# Patient Record
Sex: MALE | Race: WHITE | NOT HISPANIC OR LATINO | Employment: OTHER | URBAN - METROPOLITAN AREA
[De-identification: names, ages, dates, MRNs, and addresses within clinical notes are randomized per-mention and may not be internally consistent; named-entity substitution may affect disease eponyms.]

---

## 2023-05-01 DIAGNOSIS — J44.1 COPD EXACERBATION (MULTI): ICD-10-CM

## 2023-05-01 PROBLEM — R06.00 DYSPNEA: Status: ACTIVE | Noted: 2023-05-01

## 2023-05-01 PROBLEM — R06.1 STRIDOR: Status: ACTIVE | Noted: 2023-05-01

## 2023-05-01 PROBLEM — C32.9 LARYNGEAL CANCER (MULTI): Status: ACTIVE | Noted: 2023-05-01

## 2023-05-01 PROBLEM — N48.6 PEYRONIE DISEASE: Status: ACTIVE | Noted: 2023-05-01

## 2023-05-01 PROBLEM — N52.9 MALE ERECTILE DISORDER: Status: ACTIVE | Noted: 2023-05-01

## 2023-05-01 PROBLEM — H91.90 HEARING LOSS: Status: ACTIVE | Noted: 2023-05-01

## 2023-05-01 PROBLEM — E78.5 DYSLIPIDEMIA: Status: ACTIVE | Noted: 2023-05-01

## 2023-05-01 PROBLEM — F17.210 CIGARETTE SMOKER: Status: ACTIVE | Noted: 2023-05-01

## 2023-05-01 PROBLEM — Z85.21 HISTORY OF CANCER OF LARYNX: Status: ACTIVE | Noted: 2023-05-01

## 2023-05-01 PROBLEM — R91.8 MULTIPLE PULMONARY NODULES: Status: ACTIVE | Noted: 2023-05-01

## 2023-05-01 PROBLEM — R93.89 ABNORMAL CHEST CT: Status: ACTIVE | Noted: 2023-05-01

## 2023-05-01 PROBLEM — R06.02 SHORTNESS OF BREATH: Status: ACTIVE | Noted: 2023-05-01

## 2023-05-01 PROBLEM — M79.604 PAIN OF RIGHT LOWER EXTREMITY DUE TO ISCHEMIA: Status: ACTIVE | Noted: 2023-05-01

## 2023-05-01 PROBLEM — Z90.02 HISTORY OF LARYNGECTOMY: Status: ACTIVE | Noted: 2023-05-01

## 2023-05-01 PROBLEM — R91.1 LUNG NODULE: Status: ACTIVE | Noted: 2023-05-01

## 2023-05-01 PROBLEM — R49.0 HOARSENESS OF VOICE: Status: ACTIVE | Noted: 2023-05-01

## 2023-05-01 PROBLEM — I27.20 PULMONARY HTN (MULTI): Status: ACTIVE | Noted: 2023-05-01

## 2023-05-01 PROBLEM — R06.83 LOUD SNORING: Status: ACTIVE | Noted: 2023-05-01

## 2023-05-01 PROBLEM — K59.00 CONSTIPATION: Status: ACTIVE | Noted: 2023-05-01

## 2023-05-01 PROBLEM — J31.0 CHRONIC RHINITIS: Status: ACTIVE | Noted: 2023-05-01

## 2023-05-01 PROBLEM — J20.9 ACUTE BRONCHITIS WITH BRONCHOSPASM: Status: ACTIVE | Noted: 2023-05-01

## 2023-05-01 PROBLEM — J38.6: Status: ACTIVE | Noted: 2023-05-01

## 2023-05-01 PROBLEM — R68.89 FLU-LIKE SYMPTOMS: Status: ACTIVE | Noted: 2023-05-01

## 2023-05-01 PROBLEM — J44.9 STAGE 4 VERY SEVERE COPD BY GOLD CLASSIFICATION (MULTI): Status: ACTIVE | Noted: 2023-05-01

## 2023-05-01 PROBLEM — L30.4 INTERTRIGO: Status: ACTIVE | Noted: 2023-05-01

## 2023-05-01 PROBLEM — I50.9 CHF (CONGESTIVE HEART FAILURE) (MULTI): Status: ACTIVE | Noted: 2023-05-01

## 2023-05-01 PROBLEM — I99.8 PAIN OF RIGHT LOWER EXTREMITY DUE TO ISCHEMIA: Status: ACTIVE | Noted: 2023-05-01

## 2023-05-01 PROBLEM — Z86.74 HISTORY OF SUDDEN CARDIAC DEATH SUCCESSFULLY RESUSCITATED: Status: ACTIVE | Noted: 2023-05-01

## 2023-05-01 PROBLEM — Z95.5 HISTORY OF CORONARY ARTERY STENT PLACEMENT: Status: ACTIVE | Noted: 2023-05-01

## 2023-05-01 PROBLEM — R05.3 CHRONIC COUGH: Status: ACTIVE | Noted: 2023-05-01

## 2023-05-01 PROBLEM — R59.0 MEDIASTINAL ADENOPATHY: Status: ACTIVE | Noted: 2023-05-01

## 2023-05-01 PROBLEM — I50.22: Status: ACTIVE | Noted: 2023-04-07

## 2023-05-01 PROBLEM — E55.9 VITAMIN D DEFICIENCY: Status: ACTIVE | Noted: 2023-05-01

## 2023-05-01 PROBLEM — J38.01 VOCAL CORD PARALYSIS, UNILATERAL COMPLETE: Status: ACTIVE | Noted: 2023-05-01

## 2023-05-01 PROBLEM — H90.3 SNHL (SENSORY-NEURAL HEARING LOSS), ASYMMETRICAL: Status: ACTIVE | Noted: 2023-05-01

## 2023-05-01 PROBLEM — J38.1 VOCAL CORD POLYP: Status: ACTIVE | Noted: 2023-05-01

## 2023-05-01 PROBLEM — M65.4 TENOSYNOVITIS, DE QUERVAIN: Status: ACTIVE | Noted: 2023-05-01

## 2023-05-01 PROBLEM — R40.0 DAYTIME SOMNOLENCE: Status: ACTIVE | Noted: 2023-05-01

## 2023-05-01 PROBLEM — R05.9 COUGH: Status: ACTIVE | Noted: 2023-05-01

## 2023-05-01 PROBLEM — R10.9 ABDOMINAL CRAMPING: Status: ACTIVE | Noted: 2023-05-01

## 2023-05-01 PROBLEM — R53.83 FATIGUE: Status: ACTIVE | Noted: 2023-05-01

## 2023-05-01 RX ORDER — ALBUTEROL SULFATE 0.83 MG/ML
2.5 SOLUTION RESPIRATORY (INHALATION) 4 TIMES DAILY
COMMUNITY
Start: 2015-06-11 | End: 2023-05-01 | Stop reason: SDUPTHER

## 2023-05-01 RX ORDER — BUDESONIDE AND FORMOTEROL FUMARATE DIHYDRATE 160; 4.5 UG/1; UG/1
2 AEROSOL RESPIRATORY (INHALATION)
Qty: 1 EACH | Refills: 3 | Status: SHIPPED | OUTPATIENT
Start: 2023-05-01 | End: 2023-09-07 | Stop reason: SDUPTHER

## 2023-05-01 RX ORDER — BUDESONIDE AND FORMOTEROL FUMARATE DIHYDRATE 160; 4.5 UG/1; UG/1
2 AEROSOL RESPIRATORY (INHALATION)
COMMUNITY
Start: 2019-02-12 | End: 2023-05-01 | Stop reason: SDUPTHER

## 2023-05-01 RX ORDER — ALBUTEROL SULFATE 0.83 MG/ML
2.5 SOLUTION RESPIRATORY (INHALATION) 4 TIMES DAILY
Qty: 75 ML | Refills: 3 | Status: SHIPPED | OUTPATIENT
Start: 2023-05-01 | End: 2023-08-14 | Stop reason: SDUPTHER

## 2023-05-12 ENCOUNTER — HOSPITAL ENCOUNTER (OUTPATIENT)
Dept: DATA CONVERSION | Facility: HOSPITAL | Age: 68
End: 2023-05-12
Attending: OTOLARYNGOLOGY | Admitting: OTOLARYNGOLOGY
Payer: COMMERCIAL

## 2023-05-12 DIAGNOSIS — Z79.82 LONG TERM (CURRENT) USE OF ASPIRIN: ICD-10-CM

## 2023-05-12 DIAGNOSIS — I25.10 ATHEROSCLEROTIC HEART DISEASE OF NATIVE CORONARY ARTERY WITHOUT ANGINA PECTORIS: ICD-10-CM

## 2023-05-12 DIAGNOSIS — Z99.81 DEPENDENCE ON SUPPLEMENTAL OXYGEN: ICD-10-CM

## 2023-05-12 DIAGNOSIS — Z95.5 PRESENCE OF CORONARY ANGIOPLASTY IMPLANT AND GRAFT: ICD-10-CM

## 2023-05-12 DIAGNOSIS — I42.9 CARDIOMYOPATHY, UNSPECIFIED (MULTI): ICD-10-CM

## 2023-05-12 DIAGNOSIS — J38.3 OTHER DISEASES OF VOCAL CORDS: ICD-10-CM

## 2023-05-12 DIAGNOSIS — E78.5 HYPERLIPIDEMIA, UNSPECIFIED: ICD-10-CM

## 2023-05-12 DIAGNOSIS — Z87.891 PERSONAL HISTORY OF NICOTINE DEPENDENCE: ICD-10-CM

## 2023-05-12 DIAGNOSIS — J44.9 CHRONIC OBSTRUCTIVE PULMONARY DISEASE, UNSPECIFIED (MULTI): ICD-10-CM

## 2023-05-12 DIAGNOSIS — I25.2 OLD MYOCARDIAL INFARCTION: ICD-10-CM

## 2023-05-12 DIAGNOSIS — J38.6 STENOSIS OF LARYNX: ICD-10-CM

## 2023-06-13 ENCOUNTER — APPOINTMENT (OUTPATIENT)
Dept: PRIMARY CARE | Facility: CLINIC | Age: 68
End: 2023-06-13
Payer: COMMERCIAL

## 2023-06-15 LAB
COMPLETE PATHOLOGY REPORT: NORMAL
CONVERTED CLINICAL DIAGNOSIS-HISTORY: NORMAL
CONVERTED FINAL DIAGNOSIS: NORMAL
CONVERTED FINAL REPORT PDF LINK TO COPY AND PASTE: NORMAL
CONVERTED GROSS DESCRIPTION: NORMAL
CONVERTED INTRAOPERATIVE DIAGNOSIS: NORMAL

## 2023-07-12 ENCOUNTER — TELEPHONE (OUTPATIENT)
Dept: PRIMARY CARE | Facility: CLINIC | Age: 68
End: 2023-07-12
Payer: COMMERCIAL

## 2023-07-12 NOTE — TELEPHONE ENCOUNTER
Transition of Care    Inpatient facility: [facility]  Discharge diagnosis: SOB, Infection following a procedure  Discharged to: Home  Discharge date: 07/12/2023  Initial Call date: 07/12/2023  Spoke with patient/caregiver: wife                                                                     Do you need assistance  visits prior to your PCP visit: No  Home health care ordered: No  Have you been contacted by home care and have a start of care date: No  Are you taking medications as prescribed at discharge: Yes    Referral to [facility] Pharmacist: No  Patient advised to bring all medications to PCP follow-up appointment.  Patient advised to follow discharge instructions until provider follow-up.  TCM visit date: 07/19/2023  TCM provider visit with: Dr Sadi abraham

## 2023-08-14 DIAGNOSIS — I10 HYPERTENSION, UNSPECIFIED TYPE: ICD-10-CM

## 2023-08-14 DIAGNOSIS — J44.1 COPD EXACERBATION (MULTI): ICD-10-CM

## 2023-08-14 RX ORDER — METOPROLOL TARTRATE 25 MG/1
TABLET, FILM COATED ORAL
COMMUNITY
Start: 2023-07-11 | End: 2023-09-28 | Stop reason: ALTCHOICE

## 2023-08-14 RX ORDER — ALBUTEROL SULFATE 0.83 MG/ML
2.5 SOLUTION RESPIRATORY (INHALATION) 4 TIMES DAILY
Qty: 75 ML | Refills: 3 | Status: SHIPPED | OUTPATIENT
Start: 2023-08-14 | End: 2023-08-17 | Stop reason: SDUPTHER

## 2023-08-14 RX ORDER — IPRATROPIUM BROMIDE AND ALBUTEROL SULFATE 2.5; .5 MG/3ML; MG/3ML
SOLUTION RESPIRATORY (INHALATION)
COMMUNITY
Start: 2023-04-13 | End: 2023-09-28 | Stop reason: ALTCHOICE

## 2023-08-14 RX ORDER — LIDOCAINE 30 MG/G
CREAM TOPICAL
COMMUNITY
Start: 2023-05-26

## 2023-08-14 RX ORDER — BUDESONIDE 0.5 MG/2ML
INHALANT ORAL
COMMUNITY
Start: 2023-04-14 | End: 2023-09-22 | Stop reason: SDUPTHER

## 2023-08-14 RX ORDER — FUROSEMIDE 20 MG/1
TABLET ORAL
COMMUNITY
Start: 2023-04-11 | End: 2023-12-06

## 2023-08-14 RX ORDER — ATORVASTATIN CALCIUM 40 MG/1
40 TABLET, FILM COATED ORAL NIGHTLY
COMMUNITY
Start: 2022-12-12 | End: 2023-10-20 | Stop reason: ALTCHOICE

## 2023-08-14 RX ORDER — LISINOPRIL 2.5 MG/1
TABLET ORAL
Qty: 90 TABLET | Refills: 3 | Status: SHIPPED | OUTPATIENT
Start: 2023-08-14 | End: 2023-08-17 | Stop reason: SDUPTHER

## 2023-08-14 RX ORDER — LISINOPRIL 2.5 MG/1
TABLET ORAL
COMMUNITY
Start: 2023-04-11 | End: 2023-08-14 | Stop reason: SDUPTHER

## 2023-08-14 RX ORDER — OXYMETAZOLINE HYDROCHLORIDE 0.05 G/100ML
SPRAY NASAL
COMMUNITY
Start: 2022-03-30 | End: 2024-01-16 | Stop reason: ALTCHOICE

## 2023-08-14 RX ORDER — FLUTICASONE FUROATE, UMECLIDINIUM BROMIDE AND VILANTEROL TRIFENATATE 200; 62.5; 25 UG/1; UG/1; UG/1
1 POWDER RESPIRATORY (INHALATION) 2 TIMES DAILY
COMMUNITY
Start: 2022-11-09 | End: 2023-09-28 | Stop reason: SDUPTHER

## 2023-08-14 RX ORDER — BUDESONIDE AND FORMOTEROL FUMARATE DIHYDRATE 160; 4.5 UG/1; UG/1
2 AEROSOL RESPIRATORY (INHALATION) 2 TIMES DAILY
COMMUNITY
Start: 2023-07-11 | End: 2023-09-28 | Stop reason: SDUPTHER

## 2023-08-14 RX ORDER — IBUPROFEN 200 MG
1 TABLET ORAL DAILY
COMMUNITY
Start: 2023-07-28 | End: 2023-09-28 | Stop reason: ALTCHOICE

## 2023-08-17 DIAGNOSIS — J44.1 COPD EXACERBATION (MULTI): ICD-10-CM

## 2023-08-17 DIAGNOSIS — I10 HYPERTENSION, UNSPECIFIED TYPE: ICD-10-CM

## 2023-08-17 RX ORDER — LISINOPRIL 2.5 MG/1
TABLET ORAL
Qty: 90 TABLET | Refills: 3 | Status: SHIPPED | OUTPATIENT
Start: 2023-08-17

## 2023-08-17 RX ORDER — ALBUTEROL SULFATE 0.83 MG/ML
2.5 SOLUTION RESPIRATORY (INHALATION) 4 TIMES DAILY
Qty: 75 ML | Refills: 3 | Status: SHIPPED | OUTPATIENT
Start: 2023-08-17 | End: 2023-08-18 | Stop reason: SDUPTHER

## 2023-08-18 DIAGNOSIS — J44.1 COPD EXACERBATION (MULTI): ICD-10-CM

## 2023-08-18 RX ORDER — ALBUTEROL SULFATE 0.83 MG/ML
2.5 SOLUTION RESPIRATORY (INHALATION) 4 TIMES DAILY
Qty: 75 ML | Refills: 3 | Status: SHIPPED | OUTPATIENT
Start: 2023-08-18 | End: 2023-09-22 | Stop reason: SDUPTHER

## 2023-09-07 VITALS — WEIGHT: 139.11 LBS | HEIGHT: 69 IN | BODY MASS INDEX: 20.6 KG/M2

## 2023-09-07 DIAGNOSIS — J44.1 COPD EXACERBATION (MULTI): ICD-10-CM

## 2023-09-07 RX ORDER — BUDESONIDE AND FORMOTEROL FUMARATE DIHYDRATE 160; 4.5 UG/1; UG/1
2 AEROSOL RESPIRATORY (INHALATION)
Qty: 1 EACH | Refills: 3 | Status: SHIPPED | OUTPATIENT
Start: 2023-09-07 | End: 2023-09-25 | Stop reason: SDUPTHER

## 2023-09-14 NOTE — H&P
History & Physical Reviewed:   I have reviewed the History and Physical dated:  05-May-2023   History and Physical reviewed and relevant findings noted. Patient examined to review pertinent physical  findings.: No significant changes   Home Medications Reviewed: no changes noted   Allergies Reviewed: no changes noted       ERAS (Enhanced Recovery After Surgery):  ·  ERAS Patient: no     Consent:   COVID-19 Consent:  ·  COVID-19 Risk Consent Surgeon has reviewed key risks related to the risk of elza COVID-19 and if they contract COVID-19 what the risks are.     Attestation:   Note Completion:  I am a:  Resident/Fellow   Attending Attestation I saw and evaluated the patient.  I personally obtained the key and critical portions of the history and physical exam or was physically present for key and  critical portions performed by the resident/fellow. I reviewed the resident/fellow?s documentation and discussed the patient with the resident/fellow.  I agree with the resident/fellow?s medical decision making as documented in the note.     I personally evaluated the patient on 12-May-2023   Comments/ Additional Findings    Patient and I discussed the risks, benefits and alternatives to surgery and all questions answered.  Cleared to OR.          Electronic Signatures:  Blake Cordova)  (Signed 13-May-2023 15:12)   Authored: Note Completion   Co-Signer: History & Physical Reviewed, ERAS, Consent, Note Completion  Amol Johns (Resident))  (Signed 12-May-2023 13:33)   Authored: History & Physical Reviewed, ERAS, Consent,  Note Completion      Last Updated: 13-May-2023 15:12 by Blake Cordova)

## 2023-09-21 PROBLEM — R09.02 HYPOXEMIA: Status: ACTIVE | Noted: 2023-09-21

## 2023-09-21 PROBLEM — J96.10 CHRONIC RESPIRATORY FAILURE (MULTI): Status: ACTIVE | Noted: 2023-09-21

## 2023-09-21 PROBLEM — I50.20 HFREF (HEART FAILURE WITH REDUCED EJECTION FRACTION) (MULTI): Status: ACTIVE | Noted: 2023-09-21

## 2023-09-21 PROBLEM — J44.9 COPD (CHRONIC OBSTRUCTIVE PULMONARY DISEASE) (MULTI): Status: ACTIVE | Noted: 2023-09-21

## 2023-09-21 PROBLEM — I50.40 COMBINED SYSTOLIC AND DIASTOLIC HF (HEART FAILURE) (MULTI): Status: ACTIVE | Noted: 2023-09-21

## 2023-09-21 PROBLEM — R09.02 HYPOXIA: Status: ACTIVE | Noted: 2023-09-21

## 2023-09-21 PROBLEM — S10.93XA HEMATOMA OF NECK: Status: ACTIVE | Noted: 2023-09-21

## 2023-09-21 PROBLEM — R13.14 PHARYNGOESOPHAGEAL DYSPHAGIA: Status: ACTIVE | Noted: 2023-09-21

## 2023-09-21 PROBLEM — Q31.9 DYSPLASIA OF LARYNX: Status: ACTIVE | Noted: 2023-09-21

## 2023-09-21 PROBLEM — C32.1 CANCER OF SUPRAGLOTTIS (MULTI): Status: ACTIVE | Noted: 2023-09-21

## 2023-09-21 PROBLEM — S80.10XA LEG HEMATOMA: Status: ACTIVE | Noted: 2023-09-21

## 2023-09-21 PROBLEM — Z79.899 HIGH RISK MEDICATION USE: Status: ACTIVE | Noted: 2023-09-21

## 2023-09-21 PROBLEM — R06.89 ACUTE RESPIRATORY INSUFFICIENCY: Status: ACTIVE | Noted: 2023-09-21

## 2023-09-21 PROBLEM — K94.29: Status: ACTIVE | Noted: 2023-09-21

## 2023-09-21 RX ORDER — METOPROLOL SUCCINATE 50 MG/1
50 TABLET, EXTENDED RELEASE ORAL DAILY
COMMUNITY
Start: 2023-07-27 | End: 2023-10-20 | Stop reason: ALTCHOICE

## 2023-09-21 RX ORDER — SODIUM CHLORIDE 0.9 G/100ML
IRRIGANT IRRIGATION ONCE
COMMUNITY
End: 2023-09-28 | Stop reason: ALTCHOICE

## 2023-09-21 RX ORDER — OXYCODONE HYDROCHLORIDE 5 MG/1
5 TABLET ORAL
COMMUNITY
Start: 2023-07-20 | End: 2023-09-28 | Stop reason: SDUPTHER

## 2023-09-21 RX ORDER — BENZONATATE 200 MG/1
200 CAPSULE ORAL 3 TIMES DAILY PRN
COMMUNITY
End: 2023-11-27 | Stop reason: SDUPTHER

## 2023-09-21 RX ORDER — ACETAMINOPHEN 160 MG/5ML
SUSPENSION ORAL
COMMUNITY
End: 2024-01-16 | Stop reason: WASHOUT

## 2023-09-22 DIAGNOSIS — J44.1 COPD EXACERBATION (MULTI): ICD-10-CM

## 2023-09-25 DIAGNOSIS — J44.1 COPD EXACERBATION (MULTI): ICD-10-CM

## 2023-09-25 RX ORDER — BUDESONIDE 0.5 MG/2ML
0.5 INHALANT ORAL
Qty: 120 ML | Refills: 11 | Status: SHIPPED | OUTPATIENT
Start: 2023-09-25 | End: 2024-02-19 | Stop reason: ALTCHOICE

## 2023-09-25 RX ORDER — ALBUTEROL SULFATE 0.83 MG/ML
2.5 SOLUTION RESPIRATORY (INHALATION) 4 TIMES DAILY
Qty: 75 ML | Refills: 3 | Status: SHIPPED | OUTPATIENT
Start: 2023-09-25

## 2023-09-25 RX ORDER — BUDESONIDE AND FORMOTEROL FUMARATE DIHYDRATE 160; 4.5 UG/1; UG/1
2 AEROSOL RESPIRATORY (INHALATION)
Qty: 1 EACH | Refills: 11 | Status: SHIPPED | OUTPATIENT
Start: 2023-09-25 | End: 2023-09-28 | Stop reason: ALTCHOICE

## 2023-09-25 RX ORDER — ALBUTEROL SULFATE 90 UG/1
2 AEROSOL, METERED RESPIRATORY (INHALATION) EVERY 4 HOURS PRN
Qty: 18 G | Refills: 11 | Status: SHIPPED | OUTPATIENT
Start: 2023-09-25 | End: 2023-09-28 | Stop reason: SDUPTHER

## 2023-09-28 ENCOUNTER — OFFICE VISIT (OUTPATIENT)
Dept: PRIMARY CARE | Facility: CLINIC | Age: 68
End: 2023-09-28
Payer: COMMERCIAL

## 2023-09-28 VITALS
SYSTOLIC BLOOD PRESSURE: 116 MMHG | DIASTOLIC BLOOD PRESSURE: 60 MMHG | OXYGEN SATURATION: 92 % | BODY MASS INDEX: 23.49 KG/M2 | HEIGHT: 68 IN | WEIGHT: 155 LBS | HEART RATE: 73 BPM | RESPIRATION RATE: 18 BRPM

## 2023-09-28 DIAGNOSIS — Z00.00 ANNUAL PHYSICAL EXAM: Primary | ICD-10-CM

## 2023-09-28 DIAGNOSIS — E78.5 DYSLIPIDEMIA: ICD-10-CM

## 2023-09-28 DIAGNOSIS — C32.1 CANCER OF SUPRAGLOTTIS (MULTI): ICD-10-CM

## 2023-09-28 DIAGNOSIS — G89.18 POST-OP PAIN: ICD-10-CM

## 2023-09-28 DIAGNOSIS — Q21.12 PATENT FORAMEN OVALE (HHS-HCC): ICD-10-CM

## 2023-09-28 DIAGNOSIS — C77.0 SECONDARY AND UNSPECIFIED MALIGNANT NEOPLASM OF LYMPH NODES OF HEAD, FACE AND NECK (MULTI): ICD-10-CM

## 2023-09-28 DIAGNOSIS — J44.9 CHRONIC OBSTRUCTIVE PULMONARY DISEASE, UNSPECIFIED COPD TYPE (MULTI): ICD-10-CM

## 2023-09-28 DIAGNOSIS — J96.11 CHRONIC RESPIRATORY FAILURE WITH HYPOXIA (MULTI): ICD-10-CM

## 2023-09-28 DIAGNOSIS — I50.20 HFREF (HEART FAILURE WITH REDUCED EJECTION FRACTION) (MULTI): ICD-10-CM

## 2023-09-28 DIAGNOSIS — F17.210 CIGARETTE SMOKER: ICD-10-CM

## 2023-09-28 DIAGNOSIS — J44.1 COPD EXACERBATION (MULTI): ICD-10-CM

## 2023-09-28 DIAGNOSIS — I50.22 CHRONIC SYSTOLIC (CONGESTIVE) HEART FAILURE (MULTI): ICD-10-CM

## 2023-09-28 PROCEDURE — 1036F TOBACCO NON-USER: CPT | Performed by: INTERNAL MEDICINE

## 2023-09-28 PROCEDURE — 1126F AMNT PAIN NOTED NONE PRSNT: CPT | Performed by: INTERNAL MEDICINE

## 2023-09-28 PROCEDURE — 1159F MED LIST DOCD IN RCRD: CPT | Performed by: INTERNAL MEDICINE

## 2023-09-28 PROCEDURE — 3008F BODY MASS INDEX DOCD: CPT | Performed by: INTERNAL MEDICINE

## 2023-09-28 PROCEDURE — 99214 OFFICE O/P EST MOD 30 MIN: CPT | Performed by: INTERNAL MEDICINE

## 2023-09-28 RX ORDER — FLUTICASONE FUROATE, UMECLIDINIUM BROMIDE AND VILANTEROL TRIFENATATE 200; 62.5; 25 UG/1; UG/1; UG/1
1 POWDER RESPIRATORY (INHALATION) 2 TIMES DAILY
Qty: 60 EACH | Refills: 11 | Status: SHIPPED | OUTPATIENT
Start: 2023-09-28 | End: 2023-11-13

## 2023-09-28 RX ORDER — OXYCODONE HYDROCHLORIDE 5 MG/1
5 TABLET ORAL EVERY 6 HOURS PRN
Qty: 30 TABLET | Refills: 0 | Status: SHIPPED | OUTPATIENT
Start: 2023-09-28 | End: 2024-01-16 | Stop reason: ALTCHOICE

## 2023-09-28 RX ORDER — BUDESONIDE AND FORMOTEROL FUMARATE DIHYDRATE 160; 4.5 UG/1; UG/1
2 AEROSOL RESPIRATORY (INHALATION)
Qty: 1 EACH | Refills: 11 | Status: SHIPPED | OUTPATIENT
Start: 2023-09-28 | End: 2024-01-16 | Stop reason: ALTCHOICE

## 2023-09-28 RX ORDER — ALBUTEROL SULFATE 90 UG/1
2 AEROSOL, METERED RESPIRATORY (INHALATION) EVERY 4 HOURS PRN
Qty: 18 G | Refills: 11 | Status: SHIPPED | OUTPATIENT
Start: 2023-09-28 | End: 2024-09-27

## 2023-09-28 ASSESSMENT — PATIENT HEALTH QUESTIONNAIRE - PHQ9
2. FEELING DOWN, DEPRESSED OR HOPELESS: NOT AT ALL
SUM OF ALL RESPONSES TO PHQ9 QUESTIONS 1 & 2: 0
1. LITTLE INTEREST OR PLEASURE IN DOING THINGS: NOT AT ALL

## 2023-09-28 ASSESSMENT — PAIN SCALES - GENERAL: PAINLEVEL: 0-NO PAIN

## 2023-10-02 DIAGNOSIS — G89.18 POST-OP PAIN: ICD-10-CM

## 2023-10-02 DIAGNOSIS — R52 PAIN: ICD-10-CM
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
## 2023-10-15 PROBLEM — C77.0 SECONDARY AND UNSPECIFIED MALIGNANT NEOPLASM OF LYMPH NODES OF HEAD, FACE AND NECK (MULTI): Status: ACTIVE | Noted: 2023-10-15

## 2023-10-15 PROBLEM — Q21.12 PATENT FORAMEN OVALE (HHS-HCC): Status: ACTIVE | Noted: 2023-10-15

## 2023-10-16 NOTE — PROGRESS NOTES
"Subjective   Jordy Brian is a 68 y.o. male who presents for Follow-up.  Patient presents for follow-up.  He requests refills of his inhalers.  Breathing at baseline.        Objective     /60 (BP Location: Right arm, Patient Position: Sitting, BP Cuff Size: Adult)   Pulse 73   Resp 18   Ht 1.727 m (5' 8\")   Wt 70.3 kg (155 lb)   SpO2 92%   BMI 23.57 kg/m²      Physical Exam  Constitutional:       Appearance: He is ill-appearing.   HENT:      Head: Normocephalic and atraumatic.      Nose: Nose normal.      Mouth/Throat:      Mouth: Mucous membranes are moist.      Pharynx: Oropharynx is clear.   Eyes:      Extraocular Movements: Extraocular movements intact.      Pupils: Pupils are equal, round, and reactive to light.   Cardiovascular:      Rate and Rhythm: Normal rate and regular rhythm.   Pulmonary:      Effort: No respiratory distress.      Breath sounds: Normal breath sounds. No wheezing, rhonchi or rales.   Abdominal:      General: Bowel sounds are normal. There is no distension.      Palpations: Abdomen is soft.      Tenderness: There is no abdominal tenderness. There is no guarding.   Musculoskeletal:      Right lower leg: No edema.      Left lower leg: No edema.   Skin:     General: Skin is warm and dry.   Neurological:      Mental Status: He is alert and oriented to person, place, and time. Mental status is at baseline.   Psychiatric:         Mood and Affect: Mood normal.         Behavior: Behavior normal.         Assessment/Plan   Problem List Items Addressed This Visit       Cigarette smoker    COPD exacerbation (CMS/[facility])    Relevant Medications    albuterol (Ventolin HFA) 90 mcg/actuation inhaler    Dyslipidemia    COPD (chronic obstructive pulmonary disease) (CMS/[facility])    Relevant Medications    budesonide-formoteroL (Symbicort) 160-4.5 mcg/actuation inhaler    Trelegy Ellipta 200-62.5-25 mcg blister with device    HFrEF (heart failure with reduced ejection fraction) (CMS/[facility])    Cancer of " supraglottis (CMS/HCC)    Chronic respiratory failure (CMS/HCC)    Secondary and unspecified malignant neoplasm of lymph nodes of head, face and neck (CMS/HCC)    Patent foramen ovale     Other Visit Diagnoses       Annual physical exam    -  Primary    Relevant Orders    CBC    Comprehensive Metabolic Panel    Hemoglobin A1C    Lipid Panel    Prostate Specific Antigen    Thyroid Stimulating Hormone    Vitamin D 1,25 Dihydroxy (for eval of hypercalcemia)    Post-op pain        Relevant Medications    oxyCODONE (Roxicodone) 5 mg immediate release tablet    Chronic systolic (congestive) heart failure (CMS/HCC)              Continue medications as previously prescribed, refills provided  Return in 4 months for physical       Sadi Bronson DO

## 2023-10-19 ENCOUNTER — TELEPHONE (OUTPATIENT)
Dept: OTOLARYNGOLOGY | Facility: HOSPITAL | Age: 68
End: 2023-10-19
Payer: COMMERCIAL

## 2023-10-19 NOTE — TELEPHONE ENCOUNTER
Chart reviewed.  It appears that the original order to [facility] did not include the shower guard.    I completed the order and emailed it Sheridan Tirado RN.  She will be working with Dr. Lind tomorrow.  He's in surgery at [facility] all day today.  Sheridan will get him to sign it and fax it to [facility] tomorrow.    I called and let Nenita know.  She was very appreciative

## 2023-10-19 NOTE — TELEPHONE ENCOUNTER
Patient of Dr. Lind's calling to get a script sent to [facility] for a shower shield for his stoma.  Please advise.

## 2023-10-20 ENCOUNTER — OFFICE VISIT (OUTPATIENT)
Dept: OTOLARYNGOLOGY | Facility: CLINIC | Age: 68
End: 2023-10-20
Payer: COMMERCIAL

## 2023-10-20 ENCOUNTER — CLINICAL SUPPORT (OUTPATIENT)
Dept: AUDIOLOGY | Facility: CLINIC | Age: 68
End: 2023-10-20
Payer: COMMERCIAL

## 2023-10-20 VITALS — WEIGHT: 160.6 LBS | BODY MASS INDEX: 22.99 KG/M2 | TEMPERATURE: 96.5 F | HEIGHT: 70 IN

## 2023-10-20 DIAGNOSIS — R04.2 HEMOPTYSIS: ICD-10-CM

## 2023-10-20 DIAGNOSIS — H91.8X3 ASYMMETRICAL HEARING LOSS: Primary | ICD-10-CM

## 2023-10-20 DIAGNOSIS — C32.9 LARYNGEAL CANCER (MULTI): Primary | ICD-10-CM

## 2023-10-20 DIAGNOSIS — Q31.9 DYSPLASIA OF LARYNX: ICD-10-CM

## 2023-10-20 PROCEDURE — 1126F AMNT PAIN NOTED NONE PRSNT: CPT | Performed by: OTOLARYNGOLOGY

## 2023-10-20 PROCEDURE — 92557 COMPREHENSIVE HEARING TEST: CPT

## 2023-10-20 PROCEDURE — 31615 TRCHEOBRNCHSC EST TRACHS INC: CPT | Performed by: OTOLARYNGOLOGY

## 2023-10-20 PROCEDURE — 99214 OFFICE O/P EST MOD 30 MIN: CPT | Performed by: OTOLARYNGOLOGY

## 2023-10-20 PROCEDURE — 1160F RVW MEDS BY RX/DR IN RCRD: CPT | Performed by: OTOLARYNGOLOGY

## 2023-10-20 PROCEDURE — 31575 DIAGNOSTIC LARYNGOSCOPY: CPT | Performed by: OTOLARYNGOLOGY

## 2023-10-20 PROCEDURE — 1036F TOBACCO NON-USER: CPT | Performed by: OTOLARYNGOLOGY

## 2023-10-20 PROCEDURE — 3008F BODY MASS INDEX DOCD: CPT | Performed by: OTOLARYNGOLOGY

## 2023-10-20 PROCEDURE — 92550 TYMPANOMETRY & REFLEX THRESH: CPT

## 2023-10-20 PROCEDURE — 1159F MED LIST DOCD IN RCRD: CPT | Performed by: OTOLARYNGOLOGY

## 2023-10-20 ASSESSMENT — PATIENT HEALTH QUESTIONNAIRE - PHQ9
1. LITTLE INTEREST OR PLEASURE IN DOING THINGS: NOT AT ALL
2. FEELING DOWN, DEPRESSED OR HOPELESS: NOT AT ALL
SUM OF ALL RESPONSES TO PHQ9 QUESTIONS 1 AND 2: 0

## 2023-10-20 NOTE — PROGRESS NOTES
Here for follow up  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 remote procedure for galvanized larynx per report from welding issues      Doing well    Having some hemoptysis    Not using HME      Doing well with electrolarynx but cuts out periodically      Physical Exam:  CONSTITUTIONAL:  No acute distress  VOICE:  No hoarseness or other abnormality  RESPIRATION:  Breathing comfortably, no stridor  CV:  No clubbing/cyanosis/edema in hands  EYES:  EOM intact, sclera normal  NEURO:  Alert and oriented times 3, Cranial nerves II-XII grossly intact and symmetric bilaterally  HEAD AND FACE:  Symmetric facial features, no masses or lesions, sinuses non-tender to palpation  SALIVARY GLANDS:  Parotid and submandibular glands normal bilaterally  EARS:  Normal external ears, external auditory canals, and TMs to otoscopy, normal hearing to whispered voice.  NOSE:  External nose midline, anterior rhinoscopy is normal with limited visualization to the anterior aspect of the interior turbinates, no bleeding or drainage, no lesions  ORAL CAVITY/OROPHARYNX/LIPS:  Normal mucous membranes, normal floor of mouth/tongue/OP, no masses or lesions  PHARYNGEAL WALLS:  No masses or lesions  NECK/LYMPH:  stoma ok  SKIN:  incisions ok  PSYCH:  Alert and oriented with appropriate mood and affect         Procedure Note: Flexible Tracheoscopy  Verbal informed consent was obtained from the patient/patient's guardian. A flexible fiber optic nasolaryngoscope was placed into the patient's Tracheostomy tube. The tracheostomy tube, proximal trachea, and margareth were visualized and were normal except as listed below. Significant findings included  Some dessication noted      After informed verbal consent and to better evaluate the reconstructed pharyngo-esophagus , the nasal cavity was instilled with topical decongestant and anesthesia after which flexible endoscopy was performed showing patency of the proximal and distal  anastomosis. The flap was in good position, the flap was visualized and no mucosal abnormalities were noted.  The patient tolerated procedure well and the scope was removed,  no obvious strictures noted           Imp:  stable progres        Refer to dr chicas for consideration of TEP    Use HME< home humidifier      F/U with me 4 months

## 2023-10-20 NOTE — PROGRESS NOTES
ADULT AUDIOLOGY AUDIOMETRIC EVALUATION    Name:  Jordy Brian  :  1955  Age:  68 y.o.  Date of Evaluation:  10/20/2023    HISTORY  Jordy Brian is seen today at the request of Zan Lind MD for a reevaluation of hearing.  He arrives with his wife. He reports he obtained hearing aids from our office over 7 years ago that he does not wear. He reports they were more hassle than improvement so he does not wear them.  Patient denies ear pain, ear pressure, ear drainage, ear infections, ear surgeries, tinnitus, noise exposure, family history of hearing loss and dizziness/vertigo.      We discussed newer technology and the benefits of Bluetooth and rechargeable. [facility] are no longer [facility] providers so he will need to find a [facility] hearing providers.     AUDIOLOGIC EVALUATION    OTOSCOPY  Otoscopic inspection revealed clear canals and visualization of the eardrum bilaterally.    IMMITTANCE  Normal tympanograms were obtained bilaterally, consistent with a normal moving eardrums and the likely absence of fluid.    Ipsilateral acoustic reflexes were tested and absent at 500Hz, 1000Hz, 2000Hz, and 4000Hz bilaterally.    AUDIOMETRIC TESTING  Pure tone audiometry conducted via insert headphones from 125 Hz - 8000 Hz with good reliability was consistent with:  Right ear: mild sloping to severe sensorineural hearing loss  Left ear: normal hearing from 125 Hz - 500 Hz sloping to severe sensorineural hearing loss.     SPEECH RECOGNITION TESTING (SRT)  SRT was in agreement with pure tone averages bilaterally ( Right: 50 dB HL, Left: 25 dB HL).    WORD RECOGNITION SCORE (WRS)  WRS was (100%) in the right ear and (100%) in the left ear using ordered by difficulty NU6 word list.    IMPRESSIONS:  The results of today's assessment after consistent with normal tympanograms, absent acoustic reflexes, asymmetrical hearing loss bilaterally. The patient was counseled with regard to the  findings.    RECOMMENDATIONS:  Treatment Plan:.  Follow up with ENT/PCP as recommended.  Annual hearing assessments as recommended. Follow up sooner if patient feels hearing or symptoms have changed.  Find [facility] in their region  Start wearing current hearing aids full time   Contact the clinic with questions or concerns at [phone].     PATIENT EDUCATION:   Discussed results and recommendations with patient.  Questions were addressed and the patient was encouraged to contact our department should concerns arise.      Sabrina Dobson, CCC-A, [facility]  Licensed Audiologist

## 2023-10-23 DIAGNOSIS — R49.1 APHONIA: ICD-10-CM

## 2023-11-10 DIAGNOSIS — J44.9 CHRONIC OBSTRUCTIVE PULMONARY DISEASE, UNSPECIFIED COPD TYPE (MULTI): ICD-10-CM

## 2023-11-13 RX ORDER — FLUTICASONE FUROATE, UMECLIDINIUM BROMIDE AND VILANTEROL TRIFENATATE 200; 62.5; 25 UG/1; UG/1; UG/1
1 POWDER RESPIRATORY (INHALATION) 2 TIMES DAILY
Qty: 60 EACH | Refills: 11 | Status: SHIPPED | OUTPATIENT
Start: 2023-11-13 | End: 2023-12-07 | Stop reason: SDUPTHER

## 2023-11-15 ENCOUNTER — APPOINTMENT (OUTPATIENT)
Dept: SPEECH THERAPY | Facility: CLINIC | Age: 68
End: 2023-11-15
Payer: COMMERCIAL

## 2023-11-20 ENCOUNTER — TELEPHONE (OUTPATIENT)
Dept: OTOLARYNGOLOGY | Facility: HOSPITAL | Age: 68
End: 2023-11-20
Payer: COMMERCIAL

## 2023-11-20 NOTE — TELEPHONE ENCOUNTER
Patient would like a call back they need to order a neck mask for his oxygen can be reached at [phone]

## 2023-11-22 DIAGNOSIS — C32.9 LARYNGEAL CANCER (MULTI): Primary | ICD-10-CM

## 2023-11-22 DIAGNOSIS — Z90.02 S/P LARYNGECTOMY: ICD-10-CM

## 2023-11-22 DIAGNOSIS — R05.9 COUGH, UNSPECIFIED TYPE: ICD-10-CM

## 2023-11-22 DIAGNOSIS — Z99.81 OXYGEN DEPENDENT: ICD-10-CM

## 2023-11-22 NOTE — PROGRESS NOTES
Patients wife calling needs orders for trach collar for 02 to attach to. She reports she has one at home and it is getting worn down. Discussed Dr. Lind will need to sign the script and he is out till Monday. She understands and is ok with this plan

## 2023-11-27 RX ORDER — BENZONATATE 200 MG/1
200 CAPSULE ORAL 3 TIMES DAILY PRN
Qty: 90 CAPSULE | Refills: 2 | Status: SHIPPED | OUTPATIENT
Start: 2023-11-27

## 2023-11-27 NOTE — TELEPHONE ENCOUNTER
I spoke with patient wife.  The company he gets his O2 from does not have the trach collars for his O2.  I sent order to Shaylee De La Vega at [place].   She will forward it to Christie who has been working on this for patient.

## 2023-11-29 ENCOUNTER — TELEMEDICINE CLINICAL SUPPORT (OUTPATIENT)
Dept: SPEECH THERAPY | Facility: CLINIC | Age: 68
End: 2023-11-29
Payer: COMMERCIAL

## 2023-11-29 DIAGNOSIS — Z90.02 HISTORY OF LARYNGECTOMY: ICD-10-CM

## 2023-11-29 DIAGNOSIS — R49.1 APHONIA: ICD-10-CM

## 2023-11-29 DIAGNOSIS — C32.9 LARYNGEAL CANCER (MULTI): Primary | ICD-10-CM

## 2023-11-29 PROCEDURE — 92597 ORAL SPEECH DEVICE EVAL: CPT | Mod: CCI,GN | Performed by: SPEECH-LANGUAGE PATHOLOGIST

## 2023-11-29 PROCEDURE — 92607 EX FOR SPEECH DEVICE RX 1HR: CPT | Mod: GN | Performed by: SPEECH-LANGUAGE PATHOLOGIST

## 2023-11-29 ASSESSMENT — PAIN - FUNCTIONAL ASSESSMENT: PAIN_FUNCTIONAL_ASSESSMENT: 0-10

## 2023-11-29 ASSESSMENT — PAIN SCALES - GENERAL: PAINLEVEL_OUTOF10: 0 - NO PAIN

## 2023-11-30 PROBLEM — R49.1 APHONIA: Status: ACTIVE | Noted: 2023-11-30

## 2023-11-30 NOTE — PROGRESS NOTES
Speech-Language Pathology    Voice/TEP Evaluation    Patient Name: Jordy Brian  MRN: [id number]  Today's Date: 11/29/2023     Time Calculation  Start Time: 1530  Stop Time: 1630  Time Calculation (min): 60 min      Current Problem:  Patient Active Problem List   Diagnosis    Abdominal cramping    Constipation    Abnormal chest CT    Acute bronchitis with bronchospasm    CHF (congestive heart failure) (CMS/HCC)    Chronic left systolic heart failure (CMS/HCC)    Chronic rhinitis    Cigarette smoker    COPD exacerbation (CMS/HCC)    Stage 4 very severe COPD by GOLD classification (CMS/HCC)    Chronic cough    Cough    Loud snoring    Daytime somnolence    Dyslipidemia    Fatigue    Flu-like symptoms    Hearing loss    History of cancer of larynx    History of coronary artery stent placement    History of laryngectomy    History of sudden cardiac death successfully resuscitated    Hoarseness of voice    Intertrigo    Laryngeal cancer (CMS/HCC)    Male erectile disorder    Mediastinal adenopathy    Lung nodule    Multiple pulmonary nodules    Obstruction of larynx    Pain of right lower extremity due to ischemia    Peyronie disease    Pulmonary HTN (CMS/HCC)    Dyspnea    Shortness of breath    SNHL (sensory-neural hearing loss), asymmetrical    Stridor    Tenosynovitis, de Quervain    Vitamin D deficiency    Vocal cord paralysis, unilateral complete    Vocal cord polyp    COPD (chronic obstructive pulmonary disease) (CMS/HCC)    Combined systolic and diastolic HF (heart failure) (CMS/HCC)    HFrEF (heart failure with reduced ejection fraction) (CMS/HCC)    Cancer of supraglottis (CMS/HCC)    High risk medication use    Pain from gastrostomy tube (CMS/HCC)    Pharyngoesophageal dysphagia    Chronic respiratory failure (CMS/HCC)    Leg hematoma    Hematoma of neck    Hypoxemia    Dysplasia of larynx    Hypoxia    Acute respiratory insufficiency    Secondary and unspecified malignant neoplasm of lymph nodes of head,  face and neck (CMS/HCC)    Patent foramen ovale    Hemoptysis    Aphonia       Voice Assessment:   Patient is a 67 yo male presenting for consideration of TEP placement for increased access to alaryngeal speech.     s/p total laryngectomy, L neck dissection, and reconstruction with L  anterolateral thigh free flap on 6/21/2023. 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 Two penrose drains were placed in neck wound for twice a day betadine irrigations. Transitioned to wet to dry packing of wound on 7/10/2023.    Here today with two family friends, caregivers. Currently wearing LaryTube+O2 mask. Negative for HME. HME worn for duration of treatment following clinician request.     Completed education on alaryngeal speech options. Patient currently uses electrolarynx + gestures + written communication. Provided him with an overview of TEP placement and use to include daily maintenance, leak check and need for replacement given prosthesis failure. Patient understands need for continued management with our speech pathology team. He is interested in pursuing TE speech. Plan to assist in scheduling a clinic visit with myself and my laryngology partner, Dr. Cordova for initial placement.        TEP Plan of Care:  Therapy warranted: Yes  Frequency: PRN  Duration: Indefinitely  TEP Recommendations: Assess condition of TEP, Measure length of TEP, Determine type and order voice prosthesis, Increase alaryngeal speech at the conversational level  Prognosis: Good  Factors affecting prognosis: None  Date of onset: 06/01/23    Subjective   Current Problem: Patient is a 67 yo male presenting with aphonia following total laryngectomy, L neck dissection, and reconstruction with L  anterolateral thigh free flap on 6/21/2023. 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 Two penrose drains were placed in neck wound for twice a day  betadine irrigations. Transitioned to wet to dry packing of wound on 7/10/2023.    Currently tolerating full PO reporting increased weight weekly. Primary means of communication this date: electrolarynx. He is 70% intelligible to this novel listener. Patient indicating interest in TE voicing.     General Visit Information:  Patient Class: Outpatient  Living Environment: Home  Arrival: Accompanied by: __ (long term friends x2)  Ordering Physician: Dr. Lind  Reason for Referral: consideration for secondary TEP placement s/p total laryngectomy  Referred By: Dr. Lind  Past Medical History Relevant to Rehab: GERD, COPD, laryngeal cancer  Prior Level of Function: Decreased function  Patient Seen During This Visit: Yes  Date of Onset: 06/01/23    Objective     Pain Assessment:  Pain Assessment: 0-10  Pain Score: 0 - No pain       Voice Use Inventory:  Voice misuse/abuse: None  Exposure to Noise: No  Exposure to respiratory irritants: Yes  Consistent use of singing voice: No  Occupation relies on speaking voice: No    Patient Self Assessment:  Daily water intake: Yes  Daily caffeine intake: Yes  Alcohol intake: Yes  Smoking history: Yes  Reflux history: Yes    Voice Objective:  Motor Speech Production: WFL  Pitch:  (aphonia)  Voice Quality:  (aphonia)  Fluency: WFL  Prosody: Monotone (+electrolarynx)  Resonance: WFL    TEP Objective:   Patient demonstrated good manual dexterity for prosthesis maintenance. He is now able to drive [employer] to attend puncture placement and subsequent visits for prosthesis check/change. Encouraged patient to begin regular use of HMEs. Patient does not prefer them 2/2 increased restriction with breathing. Will continue to build tolerance - may benefit from light flow HME. Patient aware of potential increase in cough/mucus expectoration following initial period of HME use 2/2 improved pulmonary environment.     TEP Treatment:  Instruction/Information Provided: Laryngectomy supplies/resources,  Cleaning prosthesis, Candida prevention (Initial TEP education)  Education type: Verbal education provided, Written education provided  Individuals educated: Patient, Caregiver  Response to education: Verbalized understanding, Demonstrated understanding, Needs review  Patient/Caregiver verbalized understanding and agreement: Yes  Treatment Goals: Clinician will determine the appropiate size and type of  needed and successfully place a new , Patient will demonstrate good care, maintenance and use of voice prosthesis, Patient will produce intelligible TE speech with 80% accuracy, Patient will demonstrate adequate stoma management and participate in pulmonary rehabilitation    Time In: 1530  Time Out: 1635

## 2023-12-06 DIAGNOSIS — I27.20 PULMONARY HYPERTENSION, UNSPECIFIED (MULTI): ICD-10-CM

## 2023-12-06 DIAGNOSIS — E55.9 VITAMIN D DEFICIENCY, UNSPECIFIED: ICD-10-CM

## 2023-12-06 DIAGNOSIS — I50.40 UNSPECIFIED COMBINED SYSTOLIC (CONGESTIVE) AND DIASTOLIC (CONGESTIVE) HEART FAILURE (MULTI): ICD-10-CM

## 2023-12-06 RX ORDER — ERGOCALCIFEROL 1.25 MG/1
CAPSULE ORAL
Qty: 12 CAPSULE | Refills: 1 | Status: SHIPPED | OUTPATIENT
Start: 2023-12-06 | End: 2024-01-16 | Stop reason: SDUPTHER

## 2023-12-06 RX ORDER — FUROSEMIDE 20 MG/1
20 TABLET ORAL DAILY
Qty: 90 TABLET | Refills: 3 | Status: SHIPPED | OUTPATIENT
Start: 2023-12-06

## 2023-12-06 RX ORDER — METOPROLOL SUCCINATE 50 MG/1
50 TABLET, EXTENDED RELEASE ORAL DAILY
Qty: 90 TABLET | Refills: 2 | Status: SHIPPED | OUTPATIENT
Start: 2023-12-06 | End: 2024-03-29 | Stop reason: SDUPTHER

## 2023-12-07 DIAGNOSIS — J44.9 CHRONIC OBSTRUCTIVE PULMONARY DISEASE, UNSPECIFIED COPD TYPE (MULTI): ICD-10-CM

## 2023-12-07 RX ORDER — FLUTICASONE FUROATE, UMECLIDINIUM BROMIDE AND VILANTEROL TRIFENATATE 200; 62.5; 25 UG/1; UG/1; UG/1
2 POWDER RESPIRATORY (INHALATION) 2 TIMES DAILY
Qty: 180 EACH | Refills: 7 | Status: SHIPPED | OUTPATIENT
Start: 2023-12-07 | End: 2024-01-09

## 2024-01-02 ENCOUNTER — APPOINTMENT (OUTPATIENT)
Dept: PRIMARY CARE | Facility: CLINIC | Age: 69
End: 2024-01-02
Payer: COMMERCIAL

## 2024-01-08 DIAGNOSIS — J44.9 CHRONIC OBSTRUCTIVE PULMONARY DISEASE, UNSPECIFIED COPD TYPE (MULTI): ICD-10-CM

## 2024-01-09 RX ORDER — FLUTICASONE FUROATE, UMECLIDINIUM BROMIDE AND VILANTEROL TRIFENATATE 200; 62.5; 25 UG/1; UG/1; UG/1
2 POWDER RESPIRATORY (INHALATION) 2 TIMES DAILY
Qty: 60 EACH | Refills: 7 | Status: SHIPPED | OUTPATIENT
Start: 2024-01-09 | End: 2024-01-16 | Stop reason: SDUPTHER

## 2024-01-10 ENCOUNTER — LAB (OUTPATIENT)
Dept: LAB | Facility: LAB | Age: 69
End: 2024-01-10
Payer: COMMERCIAL

## 2024-01-10 DIAGNOSIS — Z00.00 ANNUAL PHYSICAL EXAM: ICD-10-CM

## 2024-01-10 LAB
ALBUMIN SERPL BCP-MCNC: 4.5 G/DL (ref 3.4–5)
ALP SERPL-CCNC: 30 U/L (ref 33–136)
ALT SERPL W P-5'-P-CCNC: 14 U/L (ref 10–52)
ANION GAP SERPL CALC-SCNC: 13 MMOL/L (ref 10–20)
AST SERPL W P-5'-P-CCNC: 21 U/L (ref 9–39)
BILIRUB SERPL-MCNC: 0.5 MG/DL (ref 0–1.2)
BUN SERPL-MCNC: 23 MG/DL (ref 6–23)
CALCIUM SERPL-MCNC: 9.7 MG/DL (ref 8.6–10.6)
CHLORIDE SERPL-SCNC: 103 MMOL/L (ref 98–107)
CHOLEST SERPL-MCNC: 185 MG/DL (ref 0–199)
CHOLESTEROL/HDL RATIO: 2.5
CO2 SERPL-SCNC: 29 MMOL/L (ref 21–32)
CREAT SERPL-MCNC: 1.05 MG/DL (ref 0.5–1.3)
EGFRCR SERPLBLD CKD-EPI 2021: 77 ML/MIN/1.73M*2
ERYTHROCYTE [DISTWIDTH] IN BLOOD BY AUTOMATED COUNT: 12.6 % (ref 11.5–14.5)
EST. AVERAGE GLUCOSE BLD GHB EST-MCNC: 103 MG/DL
GLUCOSE SERPL-MCNC: 93 MG/DL (ref 74–99)
HBA1C MFR BLD: 5.2 %
HCT VFR BLD AUTO: 38.5 % (ref 41–52)
HDLC SERPL-MCNC: 73.8 MG/DL
HGB BLD-MCNC: 12.8 G/DL (ref 13.5–17.5)
LDLC SERPL CALC-MCNC: 100 MG/DL
MCH RBC QN AUTO: 32 PG (ref 26–34)
MCHC RBC AUTO-ENTMCNC: 33.2 G/DL (ref 32–36)
MCV RBC AUTO: 96 FL (ref 80–100)
NON HDL CHOLESTEROL: 111 MG/DL (ref 0–149)
NRBC BLD-RTO: 0 /100 WBCS (ref 0–0)
PLATELET # BLD AUTO: 195 X10*3/UL (ref 150–450)
POTASSIUM SERPL-SCNC: 4.2 MMOL/L (ref 3.5–5.3)
PROT SERPL-MCNC: 7.1 G/DL (ref 6.4–8.2)
PSA SERPL-MCNC: 1.76 NG/ML
RBC # BLD AUTO: 4 X10*6/UL (ref 4.5–5.9)
SODIUM SERPL-SCNC: 141 MMOL/L (ref 136–145)
TRIGL SERPL-MCNC: 58 MG/DL (ref 0–149)
TSH SERPL-ACNC: 2.34 MIU/L (ref 0.44–3.98)
VLDL: 12 MG/DL (ref 0–40)
WBC # BLD AUTO: 9.3 X10*3/UL (ref 4.4–11.3)

## 2024-01-10 PROCEDURE — 84443 ASSAY THYROID STIM HORMONE: CPT

## 2024-01-10 PROCEDURE — 84153 ASSAY OF PSA TOTAL: CPT

## 2024-01-10 PROCEDURE — 85027 COMPLETE CBC AUTOMATED: CPT

## 2024-01-10 PROCEDURE — 82652 VIT D 1 25-DIHYDROXY: CPT

## 2024-01-10 PROCEDURE — 80053 COMPREHEN METABOLIC PANEL: CPT

## 2024-01-10 PROCEDURE — 80061 LIPID PANEL: CPT

## 2024-01-10 PROCEDURE — 83036 HEMOGLOBIN GLYCOSYLATED A1C: CPT

## 2024-01-10 PROCEDURE — 36415 COLL VENOUS BLD VENIPUNCTURE: CPT

## 2024-01-13 LAB — 1,25(OH)2D3 SERPL-MCNC: 54.7 PG/ML (ref 19.9–79.3)

## 2024-01-16 ENCOUNTER — OFFICE VISIT (OUTPATIENT)
Dept: PRIMARY CARE | Facility: CLINIC | Age: 69
End: 2024-01-16
Payer: COMMERCIAL

## 2024-01-16 VITALS
SYSTOLIC BLOOD PRESSURE: 138 MMHG | WEIGHT: 180 LBS | HEIGHT: 68 IN | DIASTOLIC BLOOD PRESSURE: 62 MMHG | BODY MASS INDEX: 27.28 KG/M2 | OXYGEN SATURATION: 96 % | HEART RATE: 76 BPM | RESPIRATION RATE: 20 BRPM

## 2024-01-16 DIAGNOSIS — I50.22: ICD-10-CM

## 2024-01-16 DIAGNOSIS — J96.11 CHRONIC RESPIRATORY FAILURE WITH HYPOXIA (MULTI): ICD-10-CM

## 2024-01-16 DIAGNOSIS — C32.1 CANCER OF SUPRAGLOTTIS (MULTI): Primary | ICD-10-CM

## 2024-01-16 DIAGNOSIS — J44.9 CHRONIC OBSTRUCTIVE PULMONARY DISEASE, UNSPECIFIED COPD TYPE (MULTI): ICD-10-CM

## 2024-01-16 DIAGNOSIS — E55.9 VITAMIN D DEFICIENCY, UNSPECIFIED: ICD-10-CM

## 2024-01-16 PROCEDURE — 3008F BODY MASS INDEX DOCD: CPT | Performed by: INTERNAL MEDICINE

## 2024-01-16 PROCEDURE — 1159F MED LIST DOCD IN RCRD: CPT | Performed by: INTERNAL MEDICINE

## 2024-01-16 PROCEDURE — 99214 OFFICE O/P EST MOD 30 MIN: CPT | Performed by: INTERNAL MEDICINE

## 2024-01-16 PROCEDURE — 1125F AMNT PAIN NOTED PAIN PRSNT: CPT | Performed by: INTERNAL MEDICINE

## 2024-01-16 PROCEDURE — 1160F RVW MEDS BY RX/DR IN RCRD: CPT | Performed by: INTERNAL MEDICINE

## 2024-01-16 PROCEDURE — 99397 PER PM REEVAL EST PAT 65+ YR: CPT | Performed by: INTERNAL MEDICINE

## 2024-01-16 PROCEDURE — 1036F TOBACCO NON-USER: CPT | Performed by: INTERNAL MEDICINE

## 2024-01-16 RX ORDER — FLUTICASONE FUROATE, UMECLIDINIUM BROMIDE AND VILANTEROL TRIFENATATE 200; 62.5; 25 UG/1; UG/1; UG/1
2 POWDER RESPIRATORY (INHALATION) DAILY
Qty: 60 EACH | Refills: 11 | Status: SHIPPED | OUTPATIENT
Start: 2024-01-16

## 2024-01-16 RX ORDER — ERGOCALCIFEROL 1.25 MG/1
1 CAPSULE ORAL
Qty: 12 CAPSULE | Refills: 3 | Status: SHIPPED | OUTPATIENT
Start: 2024-01-16

## 2024-01-16 ASSESSMENT — PAIN SCALES - GENERAL: PAINLEVEL: 6

## 2024-01-16 NOTE — PROGRESS NOTES
"Subjective   Jordy Brian is a 68 y.o. male who presents for No chief complaint on file..  Patient presents for a physical.  He has no acute complaints.  Labs reviewed with patient. Surprisingly no big issues.  Patient requests a disability placard.  He also requires a new prescription for supplemental oxygen.  He has oxygen dependent COPD.  He is on 2 to 2-1/2 L nasal cannula continuously.  Patient had an ENT appointment since his last visit.  No acute issues.  He will follow-up in 4 months.  Medications reviewed with patient.  He denies depression.  He feels safe at home.  No recent falls.        Objective     /62 (BP Location: Right arm, Patient Position: Sitting, BP Cuff Size: Adult)   Pulse 76   Resp 20   Ht 1.727 m (5' 8\")   Wt 81.6 kg (180 lb)   SpO2 96%   BMI 27.37 kg/m²      Physical Exam  Constitutional:       Appearance: He is ill-appearing.   HENT:      Head: Normocephalic and atraumatic.      Nose: Nose normal.      Mouth/Throat:      Mouth: Mucous membranes are moist.      Pharynx: Oropharynx is clear.   Eyes:      Extraocular Movements: Extraocular movements intact.      Pupils: Pupils are equal, round, and reactive to light.   Neck:      Comments: Tracheostomy  Cardiovascular:      Rate and Rhythm: Normal rate and regular rhythm.   Pulmonary:      Effort: No respiratory distress.      Breath sounds: Wheezing present. No rhonchi or rales.   Abdominal:      General: Bowel sounds are normal. There is no distension.      Palpations: Abdomen is soft.      Tenderness: There is no abdominal tenderness. There is no guarding.   Musculoskeletal:      Right lower leg: No edema.      Left lower leg: No edema.   Skin:     General: Skin is warm and dry.   Neurological:      Mental Status: He is alert and oriented to person, place, and time. Mental status is at baseline.   Psychiatric:         Mood and Affect: Mood normal.         Behavior: Behavior normal.         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

## 2024-01-19 DIAGNOSIS — J44.1 COPD EXACERBATION (MULTI): ICD-10-CM

## 2024-01-22 RX ORDER — BUDESONIDE AND FORMOTEROL FUMARATE DIHYDRATE 160; 4.5 UG/1; UG/1
AEROSOL RESPIRATORY (INHALATION)
Qty: 1 EACH | Refills: 3 | Status: SHIPPED | OUTPATIENT
Start: 2024-01-22 | End: 2024-01-23

## 2024-01-23 DIAGNOSIS — J44.1 COPD EXACERBATION (MULTI): ICD-10-CM

## 2024-01-23 RX ORDER — FLUTICASONE PROPIONATE AND SALMETEROL 100; 50 UG/1; UG/1
1 POWDER RESPIRATORY (INHALATION)
Qty: 60 EACH | Refills: 11 | Status: SHIPPED | OUTPATIENT
Start: 2024-01-23 | End: 2025-01-22

## 2024-01-25 ENCOUNTER — APPOINTMENT (OUTPATIENT)
Dept: SPEECH THERAPY | Facility: CLINIC | Age: 69
End: 2024-01-25
Payer: COMMERCIAL

## 2024-01-25 ENCOUNTER — APPOINTMENT (OUTPATIENT)
Dept: OTOLARYNGOLOGY | Facility: CLINIC | Age: 69
End: 2024-01-25
Payer: COMMERCIAL

## 2024-01-29 ENCOUNTER — TELEPHONE (OUTPATIENT)
Dept: PRIMARY CARE | Facility: CLINIC | Age: 69
End: 2024-01-29
Payer: COMMERCIAL

## 2024-01-29 NOTE — TELEPHONE ENCOUNTER
DR CARLISLE PATIENT.. NEEDS A 6 MIN WALK TEST TO GET APPROVED FOR O2. RESULTS NEED FAXED OVER [phone] ATTN LUISA. SCHEDULED FOR 2/1/24

## 2024-02-01 ENCOUNTER — APPOINTMENT (OUTPATIENT)
Dept: PRIMARY CARE | Facility: CLINIC | Age: 69
End: 2024-02-01
Payer: COMMERCIAL

## 2024-02-01 ENCOUNTER — OFFICE VISIT (OUTPATIENT)
Dept: PRIMARY CARE | Facility: CLINIC | Age: 69
End: 2024-02-01
Payer: COMMERCIAL

## 2024-02-01 VITALS — RESPIRATION RATE: 22 BRPM | OXYGEN SATURATION: 93 %

## 2024-02-01 DIAGNOSIS — M15.9 PRIMARY OSTEOARTHRITIS INVOLVING MULTIPLE JOINTS: Primary | ICD-10-CM

## 2024-02-01 DIAGNOSIS — J96.11 CHRONIC RESPIRATORY FAILURE WITH HYPOXIA (MULTI): ICD-10-CM

## 2024-02-01 PROBLEM — Z91.199 PATIENT'S NONCOMPLIANCE WITH OTHER MEDICAL TREATMENT AND REGIMEN DUE TO UNSPECIFIED REASON: Status: ACTIVE | Noted: 2023-04-14

## 2024-02-01 PROBLEM — F05 DELIRIUM DUE TO KNOWN PHYSIOLOGICAL CONDITION: Status: ACTIVE | Noted: 2023-07-04

## 2024-02-01 PROBLEM — I25.10 ATHEROSCLEROSIS OF CORONARY ARTERY: Status: ACTIVE | Noted: 2023-04-02

## 2024-02-01 PROBLEM — K59.00 CONSTIPATION: Status: RESOLVED | Noted: 2023-05-01 | Resolved: 2024-02-01

## 2024-02-01 PROBLEM — R49.0 HOARSENESS OF VOICE: Status: RESOLVED | Noted: 2023-05-01 | Resolved: 2024-02-01

## 2024-02-01 PROBLEM — Z85.21 HISTORY OF CANCER OF LARYNX: Status: RESOLVED | Noted: 2023-05-01 | Resolved: 2024-02-01

## 2024-02-01 PROBLEM — Z90.02 HISTORY OF LARYNGECTOMY: Status: RESOLVED | Noted: 2023-05-01 | Resolved: 2024-02-01

## 2024-02-01 PROBLEM — R06.83 LOUD SNORING: Status: RESOLVED | Noted: 2023-05-01 | Resolved: 2024-02-01

## 2024-02-01 PROBLEM — Z98.890 HISTORY OF CARDIOVASCULAR SURGERY: Status: ACTIVE | Noted: 2023-04-02

## 2024-02-01 PROBLEM — I42.9 CARDIOMYOPATHY (MULTI): Status: ACTIVE | Noted: 2023-05-12

## 2024-02-01 PROBLEM — R10.9 ABDOMINAL CRAMPING: Status: RESOLVED | Noted: 2023-05-01 | Resolved: 2024-02-01

## 2024-02-01 PROBLEM — E78.5 DYSLIPIDEMIA: Status: RESOLVED | Noted: 2023-05-01 | Resolved: 2024-02-01

## 2024-02-01 PROBLEM — C32.9 LARYNGEAL CANCER (MULTI): Status: RESOLVED | Noted: 2023-05-01 | Resolved: 2024-02-01

## 2024-02-01 PROBLEM — M54.2 NECK PAIN: Status: ACTIVE | Noted: 2023-04-02

## 2024-02-01 PROBLEM — J38.6: Status: RESOLVED | Noted: 2023-05-01 | Resolved: 2024-02-01

## 2024-02-01 PROBLEM — Z20.822 CONTACT WITH AND (SUSPECTED) EXPOSURE TO COVID-19: Status: ACTIVE | Noted: 2023-04-02

## 2024-02-01 PROCEDURE — 1125F AMNT PAIN NOTED PAIN PRSNT: CPT | Performed by: INTERNAL MEDICINE

## 2024-02-01 PROCEDURE — 99214 OFFICE O/P EST MOD 30 MIN: CPT | Performed by: INTERNAL MEDICINE

## 2024-02-01 PROCEDURE — 1157F ADVNC CARE PLAN IN RCRD: CPT | Performed by: INTERNAL MEDICINE

## 2024-02-01 PROCEDURE — 1159F MED LIST DOCD IN RCRD: CPT | Performed by: INTERNAL MEDICINE

## 2024-02-01 PROCEDURE — 3008F BODY MASS INDEX DOCD: CPT | Performed by: INTERNAL MEDICINE

## 2024-02-01 PROCEDURE — 1036F TOBACCO NON-USER: CPT | Performed by: INTERNAL MEDICINE

## 2024-02-01 RX ORDER — DICLOFENAC SODIUM 10 MG/G
4 GEL TOPICAL 4 TIMES DAILY
Qty: 100 G | Refills: 3 | Status: SHIPPED | OUTPATIENT
Start: 2024-02-01 | End: 2024-05-15

## 2024-02-13 NOTE — PROGRESS NOTES
Subjective   Jordy Brian is a 68 y.o. male who presents for Follow-up.  Patient presents for follow-up of hypoxia.  He has a home oxygen unit. He needs a new unit.  Walking pulse ox performed in the office today.  Patient desatted below 88%.  Patient has quit smoking.        Objective     Resp 22   SpO2 93%      Physical Exam  Constitutional:       Appearance: He is ill-appearing.   HENT:      Head: Normocephalic and atraumatic.      Nose: Nose normal.      Mouth/Throat:      Mouth: Mucous membranes are moist.      Pharynx: Oropharynx is clear.   Eyes:      Extraocular Movements: Extraocular movements intact.      Pupils: Pupils are equal, round, and reactive to light.   Neck:      Comments: Tracheostomy  Cardiovascular:      Rate and Rhythm: Normal rate and regular rhythm.   Pulmonary:      Effort: No respiratory distress.      Breath sounds: Wheezing present. No rhonchi or rales.   Abdominal:      General: Bowel sounds are normal. There is no distension.      Palpations: Abdomen is soft.      Tenderness: There is no abdominal tenderness. There is no guarding.   Musculoskeletal:      Right lower leg: No edema.      Left lower leg: No edema.   Skin:     General: Skin is warm and dry.   Neurological:      Mental Status: He is alert and oriented to person, place, and time. Mental status is at baseline.   Psychiatric:         Mood and Affect: Mood normal.         Behavior: Behavior normal.     Assessment/Plan   Problem List Items Addressed This Visit       Chronic respiratory failure (CMS/HCC)     Patient failed his ambulatory pulse ox  Supplemental oxygen          Other Visit Diagnoses       Primary osteoarthritis involving multiple joints    -  Primary    Relevant Medications    diclofenac sodium (Voltaren) 1 % gel                 Sadi Bronson DO

## 2024-02-19 RX ORDER — BUDESONIDE AND FORMOTEROL FUMARATE DIHYDRATE 160; 4.5 UG/1; UG/1
2 AEROSOL RESPIRATORY (INHALATION)
COMMUNITY
End: 2024-02-23 | Stop reason: SDUPTHER

## 2024-02-23 ENCOUNTER — APPOINTMENT (OUTPATIENT)
Dept: OTOLARYNGOLOGY | Facility: CLINIC | Age: 69
End: 2024-02-23
Payer: COMMERCIAL

## 2024-02-23 DIAGNOSIS — J44.9 CHRONIC OBSTRUCTIVE PULMONARY DISEASE, UNSPECIFIED COPD TYPE (MULTI): ICD-10-CM

## 2024-02-23 RX ORDER — BUDESONIDE AND FORMOTEROL FUMARATE DIHYDRATE 160; 4.5 UG/1; UG/1
2 AEROSOL RESPIRATORY (INHALATION)
Qty: 10.2 G | Refills: 3 | Status: SHIPPED | OUTPATIENT
Start: 2024-02-23

## 2024-03-29 DIAGNOSIS — I50.40 UNSPECIFIED COMBINED SYSTOLIC (CONGESTIVE) AND DIASTOLIC (CONGESTIVE) HEART FAILURE (MULTI): ICD-10-CM

## 2024-03-29 RX ORDER — METOPROLOL SUCCINATE 50 MG/1
50 TABLET, EXTENDED RELEASE ORAL DAILY
Qty: 90 TABLET | Refills: 2 | Status: SHIPPED | OUTPATIENT
Start: 2024-03-29

## 2024-04-05 ENCOUNTER — OFFICE VISIT (OUTPATIENT)
Dept: OTOLARYNGOLOGY | Facility: CLINIC | Age: 69
End: 2024-04-05
Payer: COMMERCIAL

## 2024-04-05 DIAGNOSIS — J30.2 SEASONAL ALLERGIC RHINITIS, UNSPECIFIED TRIGGER: ICD-10-CM

## 2024-04-05 DIAGNOSIS — R05.9 COUGH: ICD-10-CM

## 2024-04-05 DIAGNOSIS — R13.12 OROPHARYNGEAL DYSPHAGIA: Primary | ICD-10-CM

## 2024-04-05 PROCEDURE — 92511 NASOPHARYNGOSCOPY: CPT | Performed by: OTOLARYNGOLOGY

## 2024-04-05 PROCEDURE — 1159F MED LIST DOCD IN RCRD: CPT | Performed by: OTOLARYNGOLOGY

## 2024-04-05 PROCEDURE — 3008F BODY MASS INDEX DOCD: CPT | Performed by: OTOLARYNGOLOGY

## 2024-04-05 PROCEDURE — 99213 OFFICE O/P EST LOW 20 MIN: CPT | Performed by: OTOLARYNGOLOGY

## 2024-04-05 PROCEDURE — 1157F ADVNC CARE PLAN IN RCRD: CPT | Performed by: OTOLARYNGOLOGY

## 2024-04-05 RX ORDER — FLUTICASONE PROPIONATE 50 MCG
2 SPRAY, SUSPENSION (ML) NASAL DAILY
Qty: 16 G | Refills: 11 | Status: SHIPPED | OUTPATIENT
Start: 2024-04-05 | End: 2025-04-05

## 2024-04-05 RX ORDER — CETIRIZINE HYDROCHLORIDE 10 MG/1
10 TABLET ORAL DAILY PRN
Qty: 30 TABLET | Refills: 11 | Status: SHIPPED | OUTPATIENT
Start: 2024-04-05 | End: 2025-04-05

## 2024-04-06 NOTE — PROGRESS NOTES
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 remote procedure for galvanized larynx per report from welding issues   overall patient doing well but having significant cough      4.5.24      Overall he is doing well but has significant cough    Has not been taking his allergy medicine or sprays    Has overcome his dysphagia    Physical Exam:  CONSTITUTIONAL:  No acute distress  VOICE:  No hoarseness or other abnormality  RESPIRATION:  Breathing comfortably, no stridor  CV:  No clubbing/cyanosis/edema in hands  EYES:  EOM intact, sclera normal  NEURO:  Alert and oriented times 3, Cranial nerves II-XII grossly intact and symmetric bilaterally  HEAD AND FACE:  Symmetric facial features, no masses or lesions, sinuses non-tender to palpation  SALIVARY GLANDS:  Parotid and submandibular glands normal bilaterally  EARS:  Normal external ears, external auditory canals, and TMs to otoscopy, normal hearing to whispered voice.  NOSE:  External nose midline, anterior rhinoscopy is normal with limited visualization to the anterior aspect of the interior turbinates, no bleeding or drainage, no lesions  ORAL CAVITY/OROPHARYNX/LIPS:  Normal mucous membranes, normal floor of mouth/tongue/OP, no masses or lesions  PHARYNGEAL WALLS:  No masses or lesions  NECK/LYMPH:  No LAD, no thyroid masses, trachea midline  SKIN:  Neck skin is healed nicely, peristomal region okay  PSYCH:  Alert and oriented with appropriate mood and affect       After informed verbal consent and to better evaluate the reconstructed pharyngo-esophagus , the nasal cavity was instilled with topical decongestant and anesthesia after which flexible endoscopy was performed showing patency of the proximal and distal anastomosis. The flap was in good position, the flap was visualized and no mucosal abnormalities were noted.  The patient tolerated procedure well and the scope was removed,  no obvious strictures noted         Imp CALE      May  be related to either his emphysema and wheezing for which I recommended he see his family physician  We can restart his allergy meds and he can lay recumbent when utilizing Flonase to see if this helps with his rhinitis otherwise we can pursue other sprays    He has stopped smoking which is a positive but he does have audible wheeze today without shortness of breath and I have encouraged him to see his managing physician    I will see him back in 4 months time

## 2024-05-14 DIAGNOSIS — M15.9 PRIMARY OSTEOARTHRITIS INVOLVING MULTIPLE JOINTS: ICD-10-CM

## 2024-05-15 RX ORDER — DICLOFENAC SODIUM 10 MG/G
4 GEL TOPICAL 4 TIMES DAILY
Qty: 100 G | Refills: 3 | Status: SHIPPED | OUTPATIENT
Start: 2024-05-15

## 2024-05-16 ENCOUNTER — APPOINTMENT (OUTPATIENT)
Dept: PRIMARY CARE | Facility: CLINIC | Age: 69
End: 2024-05-16
Payer: COMMERCIAL

## 2024-06-12 DIAGNOSIS — J44.9 CHRONIC OBSTRUCTIVE PULMONARY DISEASE, UNSPECIFIED COPD TYPE (MULTI): ICD-10-CM

## 2024-06-13 RX ORDER — BUDESONIDE AND FORMOTEROL FUMARATE DIHYDRATE 160; 4.5 UG/1; UG/1
AEROSOL RESPIRATORY (INHALATION)
Qty: 10.2 G | Refills: 3 | Status: SHIPPED | OUTPATIENT
Start: 2024-06-13

## 2024-06-17 DIAGNOSIS — J44.1 COPD EXACERBATION (MULTI): ICD-10-CM

## 2024-06-18 RX ORDER — ALBUTEROL SULFATE 90 UG/1
2 AEROSOL, METERED RESPIRATORY (INHALATION) EVERY 4 HOURS PRN
Qty: 18 G | Refills: 11 | Status: SHIPPED | OUTPATIENT
Start: 2024-06-18 | End: 2025-06-18

## 2024-07-02 ENCOUNTER — APPOINTMENT (OUTPATIENT)
Dept: PRIMARY CARE | Facility: CLINIC | Age: 69
End: 2024-07-02
Payer: COMMERCIAL

## 2024-07-02 VITALS
HEIGHT: 68 IN | HEART RATE: 65 BPM | RESPIRATION RATE: 18 BRPM | OXYGEN SATURATION: 91 % | WEIGHT: 183 LBS | DIASTOLIC BLOOD PRESSURE: 70 MMHG | SYSTOLIC BLOOD PRESSURE: 138 MMHG | BODY MASS INDEX: 27.74 KG/M2

## 2024-07-02 DIAGNOSIS — I25.10 ATHEROSCLEROSIS OF NATIVE CORONARY ARTERY OF NATIVE HEART WITHOUT ANGINA PECTORIS: ICD-10-CM

## 2024-07-02 DIAGNOSIS — Z00.00 ANNUAL PHYSICAL EXAM: Primary | ICD-10-CM

## 2024-07-02 DIAGNOSIS — I50.9 CHRONIC CONGESTIVE HEART FAILURE, UNSPECIFIED HEART FAILURE TYPE (MULTI): ICD-10-CM

## 2024-07-02 DIAGNOSIS — J44.1 COPD EXACERBATION (MULTI): ICD-10-CM

## 2024-07-02 DIAGNOSIS — J44.9 CHRONIC OBSTRUCTIVE PULMONARY DISEASE, UNSPECIFIED COPD TYPE (MULTI): ICD-10-CM

## 2024-07-02 DIAGNOSIS — I42.9 CARDIOMYOPATHY, UNSPECIFIED TYPE (MULTI): ICD-10-CM

## 2024-07-02 PROCEDURE — 1125F AMNT PAIN NOTED PAIN PRSNT: CPT | Performed by: INTERNAL MEDICINE

## 2024-07-02 PROCEDURE — 3008F BODY MASS INDEX DOCD: CPT | Performed by: INTERNAL MEDICINE

## 2024-07-02 PROCEDURE — 99214 OFFICE O/P EST MOD 30 MIN: CPT | Performed by: INTERNAL MEDICINE

## 2024-07-02 PROCEDURE — 1157F ADVNC CARE PLAN IN RCRD: CPT | Performed by: INTERNAL MEDICINE

## 2024-07-02 RX ORDER — ALBUTEROL SULFATE 90 UG/1
2 AEROSOL, METERED RESPIRATORY (INHALATION) EVERY 4 HOURS PRN
Qty: 18 G | Refills: 11 | Status: CANCELLED | OUTPATIENT
Start: 2024-07-02 | End: 2025-07-02

## 2024-07-02 RX ORDER — BUDESONIDE AND FORMOTEROL FUMARATE DIHYDRATE 160; 4.5 UG/1; UG/1
AEROSOL RESPIRATORY (INHALATION)
Qty: 10.2 G | Refills: 3 | Status: SHIPPED | OUTPATIENT
Start: 2024-07-02

## 2024-07-02 RX ORDER — ALBUTEROL SULFATE 90 UG/1
2 AEROSOL, METERED RESPIRATORY (INHALATION) EVERY 4 HOURS PRN
Qty: 18 G | Refills: 11 | Status: SHIPPED | OUTPATIENT
Start: 2024-07-02 | End: 2025-07-02

## 2024-07-02 ASSESSMENT — PAIN SCALES - GENERAL: PAINLEVEL: 6

## 2024-07-29 NOTE — PROGRESS NOTES
"Subjective   Jordy Brian is a 69 y.o. male who presents for Follow-up.  Patient presents for follow-up.  He complains that her left lower extremity is falling asleep a lot.  He describes as pins-and-needles.  He request refills of his inhalers.        Objective     /70 (BP Location: Right arm, Patient Position: Sitting, BP Cuff Size: Adult)   Pulse 65   Resp 18   Ht 1.727 m (5' 8\")   Wt 83 kg (183 lb)   SpO2 91%   BMI 27.83 kg/m²      Physical Exam  Constitutional:       Appearance: He is ill-appearing.   HENT:      Head: Normocephalic and atraumatic.      Nose: Nose normal.      Mouth/Throat:      Mouth: Mucous membranes are moist.      Pharynx: Oropharynx is clear.   Eyes:      Extraocular Movements: Extraocular movements intact.      Pupils: Pupils are equal, round, and reactive to light.   Neck:      Comments: Tracheostomy  Cardiovascular:      Rate and Rhythm: Normal rate and regular rhythm.   Pulmonary:      Effort: No respiratory distress.      Breath sounds: Wheezing present. No rhonchi or rales.   Abdominal:      General: Bowel sounds are normal. There is no distension.      Palpations: Abdomen is soft.      Tenderness: There is no abdominal tenderness. There is no guarding.   Musculoskeletal:      Right lower leg: No edema.      Left lower leg: No edema.   Skin:     General: Skin is warm and dry.   Neurological:      Mental Status: He is alert and oriented to person, place, and time. Mental status is at baseline.   Psychiatric:         Mood and Affect: Mood normal.         Behavior: Behavior normal.     Assessment/Plan   Problem List Items Addressed This Visit       CHF (congestive heart failure) (Multi)     Continue current medications         COPD exacerbation (Multi)    Relevant Medications    albuterol 90 mcg/actuation inhaler    COPD (chronic obstructive pulmonary disease) (Multi)    Relevant Medications    budesonide-formoteroL (Symbicort) 160-4.5 mcg/actuation inhaler    " Cardiomyopathy (Multi)     Continue current medications         Atherosclerosis of coronary artery     Continue current medications          Other Visit Diagnoses       Annual physical exam    -  Primary    Relevant Orders    CBC    Comprehensive Metabolic Panel    Hemoglobin A1C    Lipid Panel    Prostate Specific Antigen    Thyroid Stimulating Hormone    Vitamin D 25-Hydroxy,Total (for eval of Vitamin D levels)          Return in 6 months for physical       Sadi Bronson DO

## 2024-08-06 DIAGNOSIS — I10 HYPERTENSION, UNSPECIFIED TYPE: ICD-10-CM

## 2024-08-07 RX ORDER — LISINOPRIL 2.5 MG/1
TABLET ORAL
Qty: 90 TABLET | Refills: 3 | Status: SHIPPED | OUTPATIENT
Start: 2024-08-07

## 2024-09-04 DIAGNOSIS — R09.89 CHEST CONGESTION: ICD-10-CM

## 2024-09-06 ENCOUNTER — APPOINTMENT (OUTPATIENT)
Dept: OTOLARYNGOLOGY | Facility: CLINIC | Age: 69
End: 2024-09-06
Payer: COMMERCIAL

## 2024-09-19 ENCOUNTER — APPOINTMENT (OUTPATIENT)
Dept: URGENT CARE | Age: 69
End: 2024-09-19
Payer: COMMERCIAL

## 2024-10-02 DIAGNOSIS — Z00.00 ENCOUNTER FOR GENERAL ADULT MEDICAL EXAMINATION WITHOUT ABNORMAL FINDINGS: ICD-10-CM

## 2024-10-03 RX ORDER — NAPROXEN SODIUM 220 MG/1
81 TABLET, FILM COATED ORAL DAILY
Qty: 90 TABLET | Refills: 3 | Status: SHIPPED | OUTPATIENT
Start: 2024-10-03

## 2024-11-01 ENCOUNTER — APPOINTMENT (OUTPATIENT)
Dept: OTOLARYNGOLOGY | Facility: CLINIC | Age: 69
End: 2024-11-01
Payer: COMMERCIAL

## 2024-11-01 DIAGNOSIS — R13.12 OROPHARYNGEAL DYSPHAGIA: ICD-10-CM

## 2024-11-01 DIAGNOSIS — R05.3 CHRONIC COUGH: Primary | ICD-10-CM

## 2024-11-01 DIAGNOSIS — Z90.02 HISTORY OF LARYNGECTOMY: ICD-10-CM

## 2024-11-01 PROCEDURE — 92511 NASOPHARYNGOSCOPY: CPT | Performed by: OTOLARYNGOLOGY

## 2024-11-01 PROCEDURE — 1036F TOBACCO NON-USER: CPT | Performed by: OTOLARYNGOLOGY

## 2024-11-01 PROCEDURE — 99213 OFFICE O/P EST LOW 20 MIN: CPT | Performed by: OTOLARYNGOLOGY

## 2024-11-01 PROCEDURE — 1157F ADVNC CARE PLAN IN RCRD: CPT | Performed by: OTOLARYNGOLOGY

## 2024-11-01 PROCEDURE — 1159F MED LIST DOCD IN RCRD: CPT | Performed by: OTOLARYNGOLOGY

## 2024-11-01 PROCEDURE — 1160F RVW MEDS BY RX/DR IN RCRD: CPT | Performed by: OTOLARYNGOLOGY

## 2024-11-01 ASSESSMENT — PATIENT HEALTH QUESTIONNAIRE - PHQ9
2. FEELING DOWN, DEPRESSED OR HOPELESS: NOT AT ALL
1. LITTLE INTEREST OR PLEASURE IN DOING THINGS: NOT AT ALL
SUM OF ALL RESPONSES TO PHQ9 QUESTIONS 1 AND 2: 0

## 2024-11-17 DIAGNOSIS — E55.9 VITAMIN D DEFICIENCY, UNSPECIFIED: ICD-10-CM

## 2024-11-19 DIAGNOSIS — E55.9 VITAMIN D DEFICIENCY, UNSPECIFIED: ICD-10-CM

## 2024-11-22 RX ORDER — ERGOCALCIFEROL 1.25 MG/1
1 CAPSULE ORAL
Qty: 12 CAPSULE | Refills: 3 | Status: SHIPPED | OUTPATIENT
Start: 2024-11-24

## 2024-12-06 DIAGNOSIS — Z90.02 HISTORY OF LARYNGECTOMY: ICD-10-CM

## 2024-12-18 DIAGNOSIS — Z00.00 ENCOUNTER FOR GENERAL ADULT MEDICAL EXAMINATION WITHOUT ABNORMAL FINDINGS: ICD-10-CM

## 2024-12-18 RX ORDER — NAPROXEN SODIUM 220 MG/1
81 TABLET, FILM COATED ORAL DAILY
Qty: 90 TABLET | Refills: 3 | Status: SHIPPED | OUTPATIENT
Start: 2024-12-18

## 2024-12-29 DIAGNOSIS — J44.9 CHRONIC OBSTRUCTIVE PULMONARY DISEASE, UNSPECIFIED COPD TYPE (MULTI): ICD-10-CM

## 2024-12-29 DIAGNOSIS — I50.40 UNSPECIFIED COMBINED SYSTOLIC (CONGESTIVE) AND DIASTOLIC (CONGESTIVE) HEART FAILURE: ICD-10-CM

## 2024-12-30 RX ORDER — BUDESONIDE AND FORMOTEROL FUMARATE DIHYDRATE 160; 4.5 UG/1; UG/1
AEROSOL RESPIRATORY (INHALATION)
Qty: 10.2 G | Refills: 3 | Status: SHIPPED | OUTPATIENT
Start: 2024-12-30

## 2024-12-30 RX ORDER — METOPROLOL SUCCINATE 50 MG/1
50 TABLET, EXTENDED RELEASE ORAL DAILY
Qty: 90 TABLET | Refills: 2 | Status: SHIPPED | OUTPATIENT
Start: 2024-12-30

## 2025-01-07 ENCOUNTER — APPOINTMENT (OUTPATIENT)
Dept: PRIMARY CARE | Facility: CLINIC | Age: 70
End: 2025-01-07
Payer: COMMERCIAL

## 2025-04-17 DIAGNOSIS — R05.9 COUGH: ICD-10-CM

## 2025-04-17 DIAGNOSIS — J30.2 SEASONAL ALLERGIC RHINITIS, UNSPECIFIED TRIGGER: ICD-10-CM

## 2025-04-18 RX ORDER — CETIRIZINE HYDROCHLORIDE 10 MG/1
10 TABLET ORAL DAILY PRN
Qty: 90 TABLET | Refills: 3 | Status: SHIPPED | OUTPATIENT
Start: 2025-04-18 | End: 2026-04-18

## 2025-04-24 LAB
25(OH)D3+25(OH)D2 SERPL-MCNC: 53 NG/ML (ref 30–100)
ALBUMIN SERPL-MCNC: 4.6 G/DL (ref 3.6–5.1)
ALP SERPL-CCNC: 33 U/L (ref 35–144)
ALT SERPL-CCNC: 17 U/L (ref 9–46)
ANION GAP SERPL CALCULATED.4IONS-SCNC: 11 MMOL/L (CALC) (ref 7–17)
AST SERPL-CCNC: 19 U/L (ref 10–35)
BILIRUB SERPL-MCNC: 0.6 MG/DL (ref 0.2–1.2)
BUN SERPL-MCNC: 14 MG/DL (ref 7–25)
CALCIUM SERPL-MCNC: 9.6 MG/DL (ref 8.6–10.3)
CHLORIDE SERPL-SCNC: 105 MMOL/L (ref 98–110)
CHOLEST SERPL-MCNC: 232 MG/DL
CHOLEST/HDLC SERPL: 3.2 (CALC)
CO2 SERPL-SCNC: 24 MMOL/L (ref 20–32)
CREAT SERPL-MCNC: 1 MG/DL (ref 0.7–1.28)
EGFRCR SERPLBLD CKD-EPI 2021: 80 ML/MIN/1.73M2
ERYTHROCYTE [DISTWIDTH] IN BLOOD BY AUTOMATED COUNT: 13 % (ref 11–15)
EST. AVERAGE GLUCOSE BLD GHB EST-MCNC: 114 MG/DL
EST. AVERAGE GLUCOSE BLD GHB EST-SCNC: 6.3 MMOL/L
GLUCOSE SERPL-MCNC: 98 MG/DL (ref 65–99)
HBA1C MFR BLD: 5.6 %
HCT VFR BLD AUTO: 40.6 % (ref 38.5–50)
HDLC SERPL-MCNC: 73 MG/DL
HGB BLD-MCNC: 13.5 G/DL (ref 13.2–17.1)
LDLC SERPL CALC-MCNC: 140 MG/DL (CALC)
MCH RBC QN AUTO: 31.5 PG (ref 27–33)
MCHC RBC AUTO-ENTMCNC: 33.3 G/DL (ref 32–36)
MCV RBC AUTO: 94.6 FL (ref 80–100)
NONHDLC SERPL-MCNC: 159 MG/DL (CALC)
PLATELET # BLD AUTO: 228 THOUSAND/UL (ref 140–400)
PMV BLD REES-ECKER: 9.6 FL (ref 7.5–12.5)
POTASSIUM SERPL-SCNC: 4.4 MMOL/L (ref 3.5–5.3)
PROT SERPL-MCNC: 7.6 G/DL (ref 6.1–8.1)
PSA SERPL-MCNC: 1.86 NG/ML
RBC # BLD AUTO: 4.29 MILLION/UL (ref 4.2–5.8)
SODIUM SERPL-SCNC: 140 MMOL/L (ref 135–146)
TRIGL SERPL-MCNC: 86 MG/DL
TSH SERPL-ACNC: 2.5 MIU/L (ref 0.4–4.5)
WBC # BLD AUTO: 9.2 THOUSAND/UL (ref 3.8–10.8)

## 2025-11-07 ENCOUNTER — APPOINTMENT (OUTPATIENT)
Dept: OTOLARYNGOLOGY | Facility: CLINIC | Age: 70
End: 2025-11-07
Payer: COMMERCIAL